# Patient Record
Sex: FEMALE | Race: WHITE
[De-identification: names, ages, dates, MRNs, and addresses within clinical notes are randomized per-mention and may not be internally consistent; named-entity substitution may affect disease eponyms.]

---

## 2018-05-12 ENCOUNTER — HOSPITAL ENCOUNTER (EMERGENCY)
Dept: HOSPITAL 97 - ER | Age: 28
Discharge: ANOTHER HOSPITAL | End: 2018-05-12
Payer: SELF-PAY

## 2018-05-12 DIAGNOSIS — K80.10: ICD-10-CM

## 2018-05-12 DIAGNOSIS — F17.210: ICD-10-CM

## 2018-05-12 DIAGNOSIS — K52.89: Primary | ICD-10-CM

## 2018-05-12 DIAGNOSIS — Z21: ICD-10-CM

## 2018-05-12 DIAGNOSIS — R19.7: ICD-10-CM

## 2018-05-12 LAB
ALBUMIN SERPL BCP-MCNC: 4.5 G/DL (ref 3.2–5.5)
ALP SERPL-CCNC: 61 IU/L (ref 42–121)
ALT SERPL W P-5'-P-CCNC: 12 IU/L (ref 10–60)
AMYLASE SERPL-CCNC: 40 U/L (ref 28–100)
AST SERPL W P-5'-P-CCNC: 15 IU/L (ref 10–42)
BUN BLD-MCNC: 10 MG/DL (ref 6–20)
GLUCOSE SERPLBLD-MCNC: 99 MG/DL (ref 65–120)
HCT VFR BLD CALC: 43.4 % (ref 36–45)
INR BLD: 1.09
LIPASE SERPL-CCNC: 16 U/L (ref 22–51)
LYMPHOCYTES # SPEC AUTO: 1.5 K/UL (ref 0.7–4.9)
MCH RBC QN AUTO: 26 PG (ref 27–35)
MCV RBC: 79.5 FL (ref 80–100)
PMV BLD: 7.7 FL (ref 7.6–11.3)
POTASSIUM SERPL-SCNC: 3.9 MEQ/L (ref 3.6–5)
RBC # BLD: 5.45 M/UL (ref 3.86–4.86)
UA COMPLETE W REFLEX CULTURE PNL UR: (no result)

## 2018-05-12 PROCEDURE — 74177 CT ABD & PELVIS W/CONTRAST: CPT

## 2018-05-12 PROCEDURE — 85730 THROMBOPLASTIN TIME PARTIAL: CPT

## 2018-05-12 PROCEDURE — 80076 HEPATIC FUNCTION PANEL: CPT

## 2018-05-12 PROCEDURE — 83690 ASSAY OF LIPASE: CPT

## 2018-05-12 PROCEDURE — 81003 URINALYSIS AUTO W/O SCOPE: CPT

## 2018-05-12 PROCEDURE — 96361 HYDRATE IV INFUSION ADD-ON: CPT

## 2018-05-12 PROCEDURE — 85610 PROTHROMBIN TIME: CPT

## 2018-05-12 PROCEDURE — 87088 URINE BACTERIA CULTURE: CPT

## 2018-05-12 PROCEDURE — 99285 EMERGENCY DEPT VISIT HI MDM: CPT

## 2018-05-12 PROCEDURE — 82150 ASSAY OF AMYLASE: CPT

## 2018-05-12 PROCEDURE — 76705 ECHO EXAM OF ABDOMEN: CPT

## 2018-05-12 PROCEDURE — 87086 URINE CULTURE/COLONY COUNT: CPT

## 2018-05-12 PROCEDURE — 81015 MICROSCOPIC EXAM OF URINE: CPT

## 2018-05-12 PROCEDURE — 96375 TX/PRO/DX INJ NEW DRUG ADDON: CPT

## 2018-05-12 PROCEDURE — 80048 BASIC METABOLIC PNL TOTAL CA: CPT

## 2018-05-12 PROCEDURE — 71045 X-RAY EXAM CHEST 1 VIEW: CPT

## 2018-05-12 PROCEDURE — 82140 ASSAY OF AMMONIA: CPT

## 2018-05-12 PROCEDURE — 81025 URINE PREGNANCY TEST: CPT

## 2018-05-12 PROCEDURE — 85025 COMPLETE CBC W/AUTO DIFF WBC: CPT

## 2018-05-12 PROCEDURE — 36415 COLL VENOUS BLD VENIPUNCTURE: CPT

## 2018-05-12 PROCEDURE — 96365 THER/PROPH/DIAG IV INF INIT: CPT

## 2018-05-12 NOTE — RAD REPORT
EXAM DESCRIPTION:  US - Abdomen Exam Limited - 5/12/2018 2:17 pm

 

CLINICAL HISTORY:  Abdominal pain

 

COMPARISON:  None.

 

FINDINGS:  Multiple 1 centimeter sized gallstones are identified. Gallbladder wall is prominent. Jahaira
cholecystic fluid is present.

 

No common duct stone or biliary tree dilatation identified.

 

IMPRESSION:  Multiple gallstones are present.

 

Gallbladder wall is prominent with pericholecystic fluid.  No biliary tree abnormality.

 

Please see separate CT abdomen report. Right upper quadrant findings involve the hepatic flexure of t
he colon and the gallbladder. Based on CT, findings were favored to be a primary colon process with s
econdary gallbladder involvement. Ultrasound findings are less definitive as to a primary or colon pr
imary process.

## 2018-05-12 NOTE — RAD REPORT
EXAM DESCRIPTION:  CT - Abdomen   Pelvis W Contrast - 5/12/2018 12:16 pm

 

CLINICAL HISTORY:  Abdominal pain, epigastric pain, history of hepatitis

 

COMPARISON:  None.

 

TECHNIQUE:  Biphasic, helical CT imaging of the abdomen and pelvis was performed following 100 ml non
-ionic IV contrast. Oral contrast was given.

 

All CT scans are performed using dose optimization technique as appropriate and may include automated
 exposure control or mA/KV adjustment according to patient size.

 

FINDINGS:  Minimal atelectasis. No acute lung base finding. No pericardial thickening or effusion.

 

Liver shows a mild to borderline fatty infiltration. No focal liver lesion is identifiable. No capsul
ar nodularity. No splenomegaly or focal splenic finding. No acute pancreatic process identifiable. No
 biliary tree dilatation.

 

Symmetric renal function is seen with no hydronephrosis or suspicious renal mass. No pyelonephritis o
r acute renal parenchymal process. No urinary bladder, uterus or ovarian abnormality. IUD is in place
.

 

No gastric dilatation or gastric wall thickening. No delay in transit of contrast. No primary duodenu
m process is seen. No dilated small bowel or small bowel wall thickening. No appendicitis findings. T
erminal ileum and ileocecal valve are unremarkable.

 

Oral CT contrast has reached the mid transverse colon. At the hepatic flexure there is asymmetric col
on wall thickening and edema. Lumen of the colon is narrowed compared to the transverse and ascending
 colons. There is a significant amount of fluid and stranding along the margin of the colon abutting 
the liver. A 4.5 centimeter fluid collection is seen. There is a thin rim of tissue around this fluid
 collection. The fluid in stranding abut the gallbladder fossa. Gallbladder walls are mildly prominen
t. Gallbladder wall appears to be secondarily involved. This is believed to be primarily a colon proc
ess. Acute cholecystitis with secondary colon involvement is felt to be lesser in likelihood. There i
s no extravasation of the oral contrast as it passes through the hepatic flexure of the colon.

 

No free air or pneumatosis. No other area of fluid or stranding.  No hernia or bulky lymphadenopathy.
 No omental thickening. No adrenal abnormality.

 

No suspicious bony findings.

 

 

IMPRESSION:  Abnormal appearance to the hepatic flexure of the colon with asymmetric wall thickening 
over a 10 centimeter length with fluid and stranding along the anti mesenteric margin of the colon wh
ere it abuts the liver and gallbladder.

 

Gallbladder wall appears to be secondarily involved by the fluid and inflammatory stranding. No bilia
ry tree dilatation. A primary gallbladder process with secondary colon involvement is felt to be a mu
ch less likely possibility.

 

Colon finding is nonspecific.  Infectious/inflammatory colitis is a consideration. HIV history was in
dicated. There is HIV and immunocompromised colitis that can develop.

 

A 4.5 centimeter fluid collection is present between the involved colon in the inferior right lobe of
 the liver. This is not yet definitive for abscess formation but needs ongoing monitoring.

 

No free air, perforation or evidence for extravasation of oral or IV contrast.

## 2018-05-12 NOTE — EDPHYS
Physician Documentation                                                                           

 [facility]                                                                

Name: Melanie Bailey                                                                            

Age: 28 yrs                                                                                       

Sex: Female                                                                                       

: 1990                                                                                   

MRN: [id number]                                                                                   

Arrival Date: 2018                                                                          

Time: 09:24                                                                                       

Account#: [id number]                                                                            

Bed 18                                                                                            

Private MD: None, None                                                                            

ED Physician Brian Oscar                                                                      

HPI:                                                                                              

                                                                                             

10:19 This 28 yrs old  Female presents to ER via Ambulatory with complaints of       armin 

      Abdominal Pain.                                                                             

10:19 The patient presents with abdominal pain. Onset: The symptoms/episode began/occurred 2  armin 

      day(s) ago. The symptoms do not radiate. Associated signs and symptoms: none. Modifying     

      factors: The symptoms are alleviated by nothing, the symptoms are aggravated by             

      nothing. Severity of pain: At its worst the pain was mild moderate in the emergency         

      department the pain is unchanged. The patient has experienced similar episodes in the       

      past, several times.                                                                        

                                                                                                  

OB/GYN:                                                                                           

09:38 LMP N/A - Irregular menses                                                              hj  

                                                                                                  

Historical:                                                                                       

- Allergies:                                                                                      

09:38 No Known Allergies;                                                                     la1 

- PMHx:                                                                                           

09:38 Hepatitis; HIV;                                                                         la1 

- PSHx:                                                                                           

09:38 ;                                                                              la1 

                                                                                                  

- Immunization history:: Adult Immunizations up to date.                                          

- Social history:: Smoking status: Patient uses tobacco products, smokes one-half pack            

  cigarettes per day.                                                                             

- Family history:: not pertinent.                                                                 

                                                                                                  

                                                                                                  

ROS:                                                                                              

10:19 Constitutional: Negative for fever, chills, and weight loss, Eyes: Negative for injury, armin 

      pain, redness, and discharge, ENT: Negative for injury, pain, and discharge, Neck:          

      Negative for injury, pain, and swelling, Cardiovascular: Negative for chest pain,           

      palpitations, and edema, Respiratory: Negative for shortness of breath, cough,              

      wheezing, and pleuritic chest pain, Back: Negative for injury and pain, : Negative        

      for injury, bleeding, discharge, and swelling, MS/Extremity: Negative for injury and        

      deformity, Skin: Negative for injury, rash, and discoloration, Neuro: Negative for          

      headache, weakness, numbness, tingling, and seizure, Psych: Negative for depression,        

      anxiety, suicide ideation, homicidal ideation, and hallucinations, Allergy/Immunology:      

      Negative for hives, rash, and allergies, Endocrine: Negative for neck swelling,             

      polydipsia, polyuria, polyphagia, and marked weight changes, Hematologic/Lymphatic:         

      Negative for swollen nodes, abnormal bleeding, and unusual bruising.                        

10:19 Abdomen/GI: Positive for abdominal pain, nausea and vomiting.                               

                                                                                                  

Exam:                                                                                             

10:19 Constitutional:  This is a well developed, well nourished patient who is awake, alert,  armin 

      and in no acute distress. Head/Face:  Normocephalic, atraumatic. Eyes:  Pupils equal        

      round and reactive to light, extra-ocular motions intact.  Lids and lashes normal.          

      Conjunctiva and sclera are non-icteric and not injected.  Cornea within normal limits.      

      Periorbital areas with no swelling, redness, or edema. ENT:  Nares patent. No nasal         

      discharge, no septal abnormalities noted.  Tympanic membranes are normal and external       

      auditory canals are clear.  Oropharynx with no redness, swelling, or masses, exudates,      

      or evidence of obstruction, uvula midline.  Mucous membranes moist. Neck:  Trachea          

      midline, no thyromegaly or masses palpated, and no cervical lymphadenopathy.  Supple,       

      full range of motion without nuchal rigidity, or vertebral point tenderness.  No            

      Meningismus. Chest/axilla:  Normal chest wall appearance and motion.  Nontender with no     

      deformity.  No lesions are appreciated. Respiratory:  Lungs have equal breath sounds        

      bilaterally, clear to auscultation and percussion.  No rales, rhonchi or wheezes noted.     

       No increased work of breathing, no retractions or nasal flaring. Back:  No spinal          

      tenderness.  No costovertebral tenderness.  Full range of motion. Female :  Normal        

      external genitalia. Skin:  Warm, dry with normal turgor.  Normal color with no rashes,      

      no lesions, and no evidence of cellulitis. MS/ Extremity:  Pulses equal, no cyanosis.       

      Neurovascular intact.  Full, normal range of motion. Neuro:  Awake and alert, GCS 15,       

      oriented to person, place, time, and situation.  Cranial nerves II-XII grossly intact.      

      Motor strength 5/5 in all extremities.  Sensory grossly intact.  Cerebellar exam            

      normal.  Normal gait. Psych:  Awake, alert, with orientation to person, place and time.     

       Behavior, mood, and affect are within normal limits.                                       

10:19 Cardiovascular: Rate: tachycardic, Rhythm: regular, Pulses: Pulses are 4+ in bilateral      

      radial, brachial, femoral, popliteal, posterior tibial and and dorsalis pedis               

      arteries.. Heart sounds: normal, Edema: is not appreciated, JVD: is not appreciated.        

                                                                                                  

Vital Signs:                                                                                      

09:38  / 95; Pulse 132; Resp 19; Temp 98.7(O); Pulse Ox 100% on R/A; Weight 75.75 kg    la1 

      (R); Height 5 ft. 2 in. (157.48 cm);                                                        

09:57  / 88; Pulse 124; Resp 18; Pulse Ox 100% on R/A;                                  hj  

10:41  / 86; Pulse 117; Resp 18; Pulse Ox 100% on R/A;                                  hj  

11:45  / 85; Pulse 102; Resp 18; Pulse Ox 100% on R/A;                                  hj  

12:30  / 84; Pulse 112; Resp 18; Pulse Ox 100% on R/A;                                  hj  

13:28  / 86; Pulse 117; Resp 18; Pulse Ox 100% on R/A;                                  hj  

15:13  / 84; Pulse 129; Resp 18; Temp 99.1(O); Pulse Ox 100% on R/A;                    hj  

15:54  / 86; Pulse 124; Resp 18; Temp 98.6(O); Pulse Ox 100% on R/A;                    hj  

09:38 Body Mass Index 30.54 (75.75 kg, 157.48 cm)                                             la1 

                                                                                                  

MDM:                                                                                              

09:51 Patient medically screened.                                                             [facility] 

09:52 Patient medically screened.                                                             [facility] 

10:21 Data reviewed: vital signs, nurses notes, lab test result(s), radiologic studies, CT    armin 

      scan, plain films.                                                                          

                                                                                                  

                                                                                             

10:18 Order name: Amylase, Serum; Complete Time: 11:51                                        [facility] 

                                                                                             

10:18 Order name: Basic Metabolic Panel; Complete Time: 11:51                                 [facility] 

                                                                                             

10:18 Order name: CBC with Diff; Complete Time: 11:51                                         [facility] 

                                                                                             

10:18 Order name: Creatinine for Radiology; Complete Time: :51                              [facility] 

                                                                                             

10:18 Order name: Hepatic Function; Complete Time: :51                                      [facility] 

                                                                                             

10:18 Order name: Lipase; Complete Time: :51                                                [facility] 

                                                                                             

10:18 Order name: Urine Microscopic Only; Complete Time: 11:51                                [facility] 

                                                                                             

10:18 Order name: AMMONIA; Complete Time: 11:51                                               [facility] 

                                                                                             

10:18 Order name: PT-INR; Complete Time: 11:51                                                [facility] 

                                                                                             

10:18 Order name: Ptt, Activated; Complete Time: 11:51                                        [facility] 

                                                                                             

10:21 Order name: Urine Culture                                                               [facility] 

                                                                                             

10:18 Order name: IV Saline Lock; Complete Time: 10:19                                        [facility] 

                                                                                             

10:18 Order name: Chest Single View XRAY; Complete Time: 15:38                                [facility] 

                                                                                             

10:18 Order name: CT Abd/Pelvis - W/Contrast; Complete Time: 12:52                            armin 

                                                                                             

10:43 Order name: Urine Dipstick--Ancillary (enter results); Complete Time: 11:51             ag  

                                                                                             

10:43 Order name: Urine Pregnancy--Ancillary (enter results); Complete Time: 11:51            ag  

                                                                                             

12:57 Order name: US Abdomen Limited; Complete Time: 15:38                                    [facility] 

                                                                                             

10:18 Order name: Labs collected and sent; Complete Time: 10:36                               [facility] 

                                                                                             

10:18 Order name: Urine Dipstick-Ancillary (obtain specimen); Complete Time: 10:36            [facility] 

                                                                                             

10:18 Order name: Urine Pregnancy Test (obtain specimen); Complete Time: 10:19                [facility] 

                                                                                                  

Administered Medications:                                                                         

10:18 Drug: NS 0.9% 1000 ml Route: IV; Rate: 1 bolus; Site: right antecubital;                hj  

15:21 Follow up: IV Status: Completed infusion                                                hj  

10:18 Drug: fentaNYL (PF) 50 mcg Route: IVP; Site: right antecubital;                         hj  

10:47 Follow up: Response: No adverse reaction; Pain is decreased                             hj  

10:18 Drug: Zofran 4 mg Route: IVP; Site: right antecubital;                                  hj  

10:46 Follow up: Response: No adverse reaction                                                hj  

10:19 Drug: Pepcid 20 mg Route: IVP; Site: right antecubital;                                 hj  

10:46 Follow up: Response: No adverse reaction                                                hj  

12:05 Drug: fentaNYL (PF) 50 mcg Route: IVP; Site: right antecubital;                         hj  

12:06 Follow up: Response: Pain is decreased                                                  hj  

12:37 Drug: Rocephin - (cefTRIAXone) 1 grams Route: IVPB; Infused Over: 30 mins; Site: right  hj  

      antecubital;                                                                                

13:10 Follow up: IV Status: Completed infusion                                                hj  

12:57 Drug: Flagyl 500 mg Volume: 100 ml; Route: IVPB; Rate: 200 ml/hr; Infused Over: 30      hj  

      mins; Site: right antecubital;                                                              

13:10 Follow up: IV Status: Completed infusion                                                hj  

14:09 Drug: Cipro 400 mg Volume: 200 ml; Route: IVPB; Infused Over: 60 mins; Site: right      iw  

      antecubital;                                                                                

14:09 Follow up: IV Status: Infusion continued upon admission                                 iw  

15:14 Drug: NS 0.9% 1000 ml Route: IV; Rate: 1 bolus; Site: right antecubital;                hj  

15:15 Follow up: IV Status: Completed infusion                                                hj  

15:50 Drug: morphine 4 mg Route: IVP; Site: right antecubital;                                hj  

15:50 Follow up: Response: No adverse reaction; Pain is decreased                             hj  

                                                                                                  

                                                                                                  

Disposition:                                                                                      

18 12:59 Transfer ordered to [facility]. Diagnosis are Abdominal tenderness, Human      

  immunodeficiency virus [HIV] disease, Other and unspecified noninfective                        

  gastroenteritis and colitis, Diarrhea, unspecified, Cholecystitis,                              

  Cholelithiasis.                                                                                 

- Reason for transfer: Higher level of care.                                                      

- Accepting physician is to [facility].. dr marie.                                                     

- Condition is Fair.                                                                              

- Problem is new.                                                                                 

- Symptoms have improved.                                                                         

                                                                                                  

                                                                                                  

                                                                                                  

Signatures:                                                                                       

Dispatcher [employer]                           EDMS                                                 

Brian Oscar MD MD cha Williams, Irene, RN                     [place]NGELA                                                      

Carlos Enrique Morris RN                         [place]NGEL[place]   laArtis Jacobo RN                      [place]NGELA   hj                                                   

                                                                                                  

Corrections: (The following items were deleted from the chart)                                    

10:22 10:21 Occult Blood+PA.LAB.BRZ ordered. [facility]

13:22 12:59 2018 12:59 Transfer ordered to [facility]. Diagnosis is Abdominal       armin 

      tenderness; Human immunodeficiency virus [HIV] disease; Other and unspecified               

      noninfective gastroenteritis and colitis; Diarrhea, unspecified. Reason for transfer:       

      Higher level of care. Accepting physician is to [facility]. Condition is Fair. Problem is         

      new. Symptoms have improved. [facility]                                                            

14:16 13:22 2018 12:59 Transfer ordered to [facility]. Diagnosis is Abdominal       armin 

      tenderness; Human immunodeficiency virus [HIV] disease; Other and unspecified               

      noninfective gastroenteritis and colitis; Diarrhea, unspecified. Reason for transfer:       

      Higher level of care. Accepting physician is to [facility].. dr marie. Condition is Fair.        

      Problem is new. Symptoms have improved. [facility]                                                 

15:56 14:16 2018 12:59 Transfer ordered to [facility]. Diagnosis is Abdominal       hj  

      tenderness; Human immunodeficiency virus [HIV] disease; Other and unspecified               

      noninfective gastroenteritis and colitis; Diarrhea, unspecified; Cholecystitis;             

      Cholelithiasis. Reason for transfer: Higher level of care. Accepting physician is to        

      [facility].. dr marie. Condition is Fair. Problem is new. Symptoms have improved. [facility]            

                                                                                                  

**************************************************************************************************

## 2018-05-12 NOTE — RAD REPORT
EXAM DESCRIPTION:  RAD - Chest Single View - 5/12/2018 11:08 am

 

CLINICAL HISTORY:  Abdominal pain, abdominal distention

 

COMPARISON:  None.

 

TECHNIQUE:  AP portable chest image was obtained 1102 hour .

 

FINDINGS:  Lung volumes are low accentuating heart, vasculature and lung markings. No peripheral mass
 or consolidation. Significant failure or volume overload doubtful. Heart and vasculature are normal.
 No measurable pleural effusion and no pneumothorax. No gross bony abnormality seen. No acute aortic 
findings suspected.

 

IMPRESSION:  Shallow inspiration film showing no focal infiltrate.

 

No significant failure or volume overload.

## 2018-05-12 NOTE — ER
Nurse's Notes                                                                                     

 [facility]                                                                

Name: Melanie Bailey                                                                            

Age: 28 yrs                                                                                       

Sex: Female                                                                                       

: 1990                                                                                   

MRN: [id number]                                                                                   

Arrival Date: 2018                                                                          

Time: 09:24                                                                                       

Account#: [id number]                                                                            

Bed 18                                                                                            

Private MD: None, None                                                                            

Diagnosis: Abdominal tenderness;Human immunodeficiency virus [HIV] disease;Other and unspecified  

  noninfective gastroenteritis and colitis;Diarrhea,                                              

  unspecified;Cholecystitis;Cholelithiasis                                                        

                                                                                                  

Presentation:                                                                                     

                                                                                             

09:37 Presenting complaint: Patient states: I have had epigastric pain for 3 days, denies     la1 

      N/V, reports diarrhea. Denies fever, chills. Transition of care: patient was not            

      received from another setting of care. Onset of symptoms was May 12, 2018. Initial          

      Sepsis Screen: Does the patient meet any 2 criteria? No. Patient's initial sepsis           

      screen is negative. Does the patient have a suspected source of infection? No.              

      Patient's initial sepsis screen is negative. Care prior to arrival: None.                   

09:37 Method Of Arrival: Ambulatory                                                           la1 

09:37 Acuity: EZRA 2                                                                           la1 

                                                                                                  

Triage Assessment:                                                                                

09:43 General: Appears in no apparent distress. uncomfortable, Behavior is calm, cooperative, hj  

      appropriate for age. Pain: Complains of pain in abdomen. GI: Reports lower abdominal        

      pain, upper abdominal pain, nausea, vomiting.                                               

                                                                                                  

OB/GYN:                                                                                           

09:38 LMP N/A - Irregular menses                                                              hj  

                                                                                                  

Historical:                                                                                       

- Allergies:                                                                                      

09:38 No Known Allergies;                                                                     la1 

- PMHx:                                                                                           

09:38 Hepatitis; HIV;                                                                         la1 

- PSHx:                                                                                           

09:38 ;                                                                              la1 

                                                                                                  

- Immunization history:: Adult Immunizations up to date.                                          

- Social history:: Smoking status: Patient uses tobacco products, smokes one-half pack            

  cigarettes per day.                                                                             

- Family history:: not pertinent.                                                                 

                                                                                                  

                                                                                                  

Screenin:43 Abuse screen: Denies threats or abuse. Denies injuries from another. Nutritional        hj  

      screening: No deficits noted. Tuberculosis screening: No symptoms or risk factors           

      identified. Fall Risk None identified.                                                      

                                                                                                  

Assessment:                                                                                       

09:44 GI: Bowel sounds present X 4 quads.                                                     hj  

09:44 General: Appears in no apparent distress. uncomfortable, obese, Behavior is calm,       hj  

      cooperative, appropriate for age. Pain: Complains of pain in abdomen. Neuro: Level of       

      Consciousness is awake, alert, obeys commands, Oriented to person, place, time,             

      situation, Appropriate for age. Cardiovascular: Capillary refill < 3 seconds Patient's      

      skin is warm and dry. Respiratory: Airway is patent Respiratory effort is even,             

      unlabored, Respiratory pattern is regular, symmetrical. : No signs and/or symptoms        

      were reported regarding the genitourinary system. EENT: No signs and/or symptoms were       

      reported regarding the EENT system. Derm: No signs and/or symptoms reported regarding       

      the dermatologic system. Musculoskeletal: No signs and/or symptoms reported regarding       

      the musculoskeletal system.                                                                 

10:05 Reassessment: pt states she is currently at [facility] for IV drug abuse, used  iw  

      heroin and Dilaudid, last used in March, has been in rehab since May 1st, is originally     

      from [place].                                                                              

10:12 GI: Reports lower abdominal pain, upper abdominal pain, diarrhea.                       hj  

10:40 Reassessment: left voicemail to CT, pt finished contrast;.                              hj  

11:45 Reassessment: Patient and/or family updated on plan of care and expected duration. Pain hj  

      level reassessed. Patient is alert, oriented x 3, equal unlabored respirations, skin        

      warm/dry/pink.                                                                              

12:10 Reassessment: wheeled to CT;.                                                           hj  

12:25 Reassessment: Patient and/or family updated on plan of care and expected duration. Pain hj  

      level reassessed. Patient is alert, oriented x 3, equal unlabored respirations, skin        

      warm/dry/pink. back from CT;.                                                               

13:28 Reassessment: Patient and/or family updated on plan of care and expected duration. Pain hj  

      level reassessed. Patient is alert, oriented x 3, equal unlabored respirations, skin        

      warm/dry/pink. for transfer; awaiting paper works;.                                         

14:30 Reassessment: Patient and/or family updated on plan of care and expected duration. Pain hj  

      level reassessed. Patient is alert, oriented x 3, equal unlabored respirations, skin        

      warm/dry/pink. awaiting ambulance;.                                                         

15:20 Reassessment: Patient and/or family updated on plan of care and expected duration. Pain hj  

      level reassessed. Patient is alert, oriented x 3, equal unlabored respirations, skin        

      warm/dry/pink. HR elevated to 130-140's, provider informed; NS 1L bolus ordered;.           

15:54 Reassessment: reports given to  EMS;.                                                 hj  

                                                                                                  

Vital Signs:                                                                                      

09:38  / 95; Pulse 132; Resp 19; Temp 98.7(O); Pulse Ox 100% on R/A; Weight 75.75 kg    la1 

      (R); Height 5 ft. 2 in. (157.48 cm);                                                        

09:57  / 88; Pulse 124; Resp 18; Pulse Ox 100% on R/A;                                  hj  

10:41  / 86; Pulse 117; Resp 18; Pulse Ox 100% on R/A;                                  hj  

11:45  / 85; Pulse 102; Resp 18; Pulse Ox 100% on R/A;                                  hj  

12:30  / 84; Pulse 112; Resp 18; Pulse Ox 100% on R/A;                                  hj  

13:28  / 86; Pulse 117; Resp 18; Pulse Ox 100% on R/A;                                  hj  

15:13  / 84; Pulse 129; Resp 18; Temp 99.1(O); Pulse Ox 100% on R/A;                    hj  

15:54  / 86; Pulse 124; Resp 18; Temp 98.6(O); Pulse Ox 100% on R/A;                    hj  

09:38 Body Mass Index 30.54 (75.75 kg, 157.48 cm)                                             la1 

                                                                                                  

ED Course:                                                                                        

09:24 Patient arrived in ED.                                                                  mr  

09:25 None, None is Private Physician.                                                        mr  

09:38 Triage completed.                                                                       la1 

09:39 Arm band placed on right wrist.                                                         la1 

09:41 Artis Johnson, RN is Primary Nurse.                                                    hj  

09:44 Patient has correct armband on for positive identification. Placed in gown. Bed in low  hj  

      position. Call light in reach. Side rails up X 1. Adult w/ patient.                         

09:51 Brian Oscar MD is Attending Physician.                                             armin 

10:09 Inserted saline lock: 20 gauge in right antecubital area, using aseptic technique.      iw  

      Blood collected.                                                                            

10:46 Urine collected: clean catch specimen, cloudy, pamela colored.                           jb1 

11:08 Chest Single View XRAY In Process Unspecified.                                          EDMS

12:12 CT completed. Patient moved to CT via wheelchair. Patient moved back from CT.           cw1 

12:17 CT Abd/Pelvis - W/Contrast In Process Unspecified.                                      EDMS

13:29 Ultrasound completed. Patient tolerated well. Notified ED Physician roque.           sg3 

15:55 No provider procedures requiring assistance completed. Patient transferred, IV remains  hj  

      in place. intact.                                                                           

                                                                                                  

Administered Medications:                                                                         

10:18 Drug: NS 0.9% 1000 ml Route: IV; Rate: 1 bolus; Site: right antecubital;                hj  

15:21 Follow up: IV Status: Completed infusion                                                hj  

10:18 Drug: fentaNYL (PF) 50 mcg Route: IVP; Site: right antecubital;                         hj  

10:47 Follow up: Response: No adverse reaction; Pain is decreased                             hj  

10:18 Drug: Zofran 4 mg Route: IVP; Site: right antecubital;                                  hj  

10:46 Follow up: Response: No adverse reaction                                                hj  

10:19 Drug: Pepcid 20 mg Route: IVP; Site: right antecubital;                                 hj  

10:46 Follow up: Response: No adverse reaction                                                hj  

12:05 Drug: fentaNYL (PF) 50 mcg Route: IVP; Site: right antecubital;                         hj  

12:06 Follow up: Response: Pain is decreased                                                  hj  

12:37 Drug: Rocephin - (cefTRIAXone) 1 grams Route: IVPB; Infused Over: 30 mins; Site: right  hj  

      antecubital;                                                                                

13:10 Follow up: IV Status: Completed infusion                                                hj  

12:57 Drug: Flagyl 500 mg Volume: 100 ml; Route: IVPB; Rate: 200 ml/hr; Infused Over: 30      hj  

      mins; Site: right antecubital;                                                              

13:10 Follow up: IV Status: Completed infusion                                                hj  

14:09 Drug: Cipro 400 mg Volume: 200 ml; Route: IVPB; Infused Over: 60 mins; Site: right      iw  

      antecubital;                                                                                

14:09 Follow up: IV Status: Infusion continued upon admission                                 iw  

15:14 Drug: NS 0.9% 1000 ml Route: IV; Rate: 1 bolus; Site: right antecubital;                hj  

15:15 Follow up: IV Status: Completed infusion                                                hj  

15:50 Drug: morphine 4 mg Route: IVP; Site: right antecubital;                                hj  

15:50 Follow up: Response: No adverse reaction; Pain is decreased                             hj  

                                                                                                  

                                                                                                  

Outcome:                                                                                          

12:59 ER care complete, transfer ordered by MD. funez 

15:55 Transferred by ground EMS to [facility].                        hj  

15:55 Condition: stable                                                                           

15:55 Instructed on the need for transfer, Demonstrated understanding of instructions.            

15:56 Patient left the ED.                                                                    hj  

                                                                                                  

Signatures:                                                                                       

Dispatcher [employer]                           EDMS                                                 

YeseniaAdrian                                 jb1                                                  

Brian Oscar MD MD cha Rivera, Maria                                mr                                                   

Nancy Paula, RN                     Tamy Mohan                             cw1                                                  

Carlos Enrique Morris RN RN   la1                                                  

Artis Johnson RN RN hj Godinez, Sarah                               sg3                                                  

                                                                                                  

Corrections: (The following items were deleted from the chart)                                    

10:13 09:44 : No signs and/or symptoms were reported regarding the genitourinary system. jose garcia  

14:19 14:18 In radiology for Abdomen Limited+US.RAD.BRZ. EDMS                                 sg3 

                                                                                                  

**************************************************************************************************